# Patient Record
Sex: MALE | Race: WHITE | Employment: OTHER | URBAN - METROPOLITAN AREA
[De-identification: names, ages, dates, MRNs, and addresses within clinical notes are randomized per-mention and may not be internally consistent; named-entity substitution may affect disease eponyms.]

---

## 2018-04-02 ENCOUNTER — HOSPITAL ENCOUNTER (OUTPATIENT)
Dept: NON INVASIVE DIAGNOSTICS | Age: 83
Discharge: HOME | End: 2018-04-02
Attending: INTERNAL MEDICINE
Payer: MEDICARE

## 2018-04-02 DIAGNOSIS — R00.1 SEVERE SINUS BRADYCARDIA: ICD-10-CM

## 2018-04-02 PROCEDURE — 93225 XTRNL ECG REC<48 HRS REC: CPT

## 2019-10-30 ENCOUNTER — HOSPITAL ENCOUNTER (OUTPATIENT)
Age: 84
Setting detail: OUTPATIENT SURGERY
Discharge: HOME | End: 2019-10-30
Attending: INTERNAL MEDICINE | Admitting: INTERNAL MEDICINE
Payer: MEDICARE

## 2019-10-30 VITALS
TEMPERATURE: 98.1 F | HEIGHT: 66 IN | SYSTOLIC BLOOD PRESSURE: 125 MMHG | OXYGEN SATURATION: 98 % | WEIGHT: 170 LBS | RESPIRATION RATE: 16 BRPM | BODY MASS INDEX: 27.32 KG/M2 | DIASTOLIC BLOOD PRESSURE: 70 MMHG | HEART RATE: 63 BPM

## 2019-10-30 DIAGNOSIS — I25.83 CORONARY ARTERY DISEASE DUE TO LIPID RICH PLAQUE: ICD-10-CM

## 2019-10-30 DIAGNOSIS — I25.10 CORONARY ARTERY DISEASE DUE TO LIPID RICH PLAQUE: ICD-10-CM

## 2019-10-30 LAB
ANION GAP SERPL CALC-SCNC: 4 MMOL/L (ref 5–15)
BUN SERPL-MCNC: 19 MG/DL (ref 6–20)
BUN/CREAT SERPL: 19 (ref 12–20)
CALCIUM SERPL-MCNC: 8.9 MG/DL (ref 8.5–10.1)
CHLORIDE SERPL-SCNC: 105 MMOL/L (ref 97–108)
CO2 SERPL-SCNC: 28 MMOL/L (ref 21–32)
CREAT SERPL-MCNC: 1.02 MG/DL (ref 0.7–1.3)
ERYTHROCYTE [DISTWIDTH] IN BLOOD BY AUTOMATED COUNT: 12.1 % (ref 11.5–14.5)
GLUCOSE SERPL-MCNC: 86 MG/DL (ref 65–100)
HCT VFR BLD AUTO: 48.2 % (ref 36.6–50.3)
HGB BLD-MCNC: 15.8 G/DL (ref 12.1–17)
MCH RBC QN AUTO: 29.6 PG (ref 26–34)
MCHC RBC AUTO-ENTMCNC: 32.8 G/DL (ref 30–36.5)
MCV RBC AUTO: 90.3 FL (ref 80–99)
NRBC # BLD: 0 K/UL (ref 0–0.01)
NRBC BLD-RTO: 0 PER 100 WBC
PLATELET # BLD AUTO: 263 K/UL (ref 150–400)
PMV BLD AUTO: 10.2 FL (ref 8.9–12.9)
POTASSIUM SERPL-SCNC: 4.2 MMOL/L (ref 3.5–5.1)
RBC # BLD AUTO: 5.34 M/UL (ref 4.1–5.7)
SODIUM SERPL-SCNC: 137 MMOL/L (ref 136–145)
WBC # BLD AUTO: 6.4 K/UL (ref 4.1–11.1)

## 2019-10-30 PROCEDURE — C1753 CATH, INTRAVAS ULTRASOUND: HCPCS | Performed by: INTERNAL MEDICINE

## 2019-10-30 PROCEDURE — 74011000258 HC RX REV CODE- 258: Performed by: INTERNAL MEDICINE

## 2019-10-30 PROCEDURE — C1894 INTRO/SHEATH, NON-LASER: HCPCS | Performed by: INTERNAL MEDICINE

## 2019-10-30 PROCEDURE — 77030004538 HC CATH ANGI DX MP BSC -A: Performed by: INTERNAL MEDICINE

## 2019-10-30 PROCEDURE — 92928 PRQ TCAT PLMT NTRAC ST 1 LES: CPT | Performed by: INTERNAL MEDICINE

## 2019-10-30 PROCEDURE — 93454 CORONARY ARTERY ANGIO S&I: CPT | Performed by: INTERNAL MEDICINE

## 2019-10-30 PROCEDURE — 80048 BASIC METABOLIC PNL TOTAL CA: CPT

## 2019-10-30 PROCEDURE — C1725 CATH, TRANSLUMIN NON-LASER: HCPCS | Performed by: INTERNAL MEDICINE

## 2019-10-30 PROCEDURE — 85027 COMPLETE CBC AUTOMATED: CPT

## 2019-10-30 PROCEDURE — 77030013744: Performed by: INTERNAL MEDICINE

## 2019-10-30 PROCEDURE — 99153 MOD SED SAME PHYS/QHP EA: CPT | Performed by: INTERNAL MEDICINE

## 2019-10-30 PROCEDURE — C1887 CATHETER, GUIDING: HCPCS | Performed by: INTERNAL MEDICINE

## 2019-10-30 PROCEDURE — 93571 IV DOP VEL&/PRESS C FLO 1ST: CPT | Performed by: INTERNAL MEDICINE

## 2019-10-30 PROCEDURE — 74011250637 HC RX REV CODE- 250/637: Performed by: INTERNAL MEDICINE

## 2019-10-30 PROCEDURE — C1874 STENT, COATED/COV W/DEL SYS: HCPCS | Performed by: INTERNAL MEDICINE

## 2019-10-30 PROCEDURE — 74011250636 HC RX REV CODE- 250/636: Performed by: INTERNAL MEDICINE

## 2019-10-30 PROCEDURE — 99152 MOD SED SAME PHYS/QHP 5/>YRS: CPT | Performed by: INTERNAL MEDICINE

## 2019-10-30 PROCEDURE — 74011636320 HC RX REV CODE- 636/320: Performed by: INTERNAL MEDICINE

## 2019-10-30 PROCEDURE — C1769 GUIDE WIRE: HCPCS | Performed by: INTERNAL MEDICINE

## 2019-10-30 PROCEDURE — 36415 COLL VENOUS BLD VENIPUNCTURE: CPT

## 2019-10-30 PROCEDURE — 74011000250 HC RX REV CODE- 250: Performed by: INTERNAL MEDICINE

## 2019-10-30 DEVICE — XIENCE SIERRA™ EVEROLIMUS ELUTING CORONARY STENT SYSTEM 3.50 MM X 15 MM / RAPID-EXCHANGE
Type: IMPLANTABLE DEVICE | Status: FUNCTIONAL
Brand: XIENCE SIERRA™

## 2019-10-30 RX ORDER — FENTANYL CITRATE 50 UG/ML
INJECTION, SOLUTION INTRAMUSCULAR; INTRAVENOUS AS NEEDED
Status: DISCONTINUED | OUTPATIENT
Start: 2019-10-30 | End: 2019-10-30 | Stop reason: HOSPADM

## 2019-10-30 RX ORDER — SODIUM CHLORIDE 0.9 % (FLUSH) 0.9 %
5-40 SYRINGE (ML) INJECTION EVERY 8 HOURS
Status: CANCELLED | OUTPATIENT
Start: 2019-10-30

## 2019-10-30 RX ORDER — LIDOCAINE HYDROCHLORIDE 10 MG/ML
INJECTION INFILTRATION; PERINEURAL AS NEEDED
Status: DISCONTINUED | OUTPATIENT
Start: 2019-10-30 | End: 2019-10-30 | Stop reason: HOSPADM

## 2019-10-30 RX ORDER — SODIUM CHLORIDE 9 MG/ML
3 INJECTION, SOLUTION INTRAVENOUS CONTINUOUS
Status: DISPENSED | OUTPATIENT
Start: 2019-10-30 | End: 2019-10-30

## 2019-10-30 RX ORDER — HEPARIN SODIUM 1000 [USP'U]/ML
INJECTION, SOLUTION INTRAVENOUS; SUBCUTANEOUS AS NEEDED
Status: DISCONTINUED | OUTPATIENT
Start: 2019-10-30 | End: 2019-10-30 | Stop reason: HOSPADM

## 2019-10-30 RX ORDER — SODIUM CHLORIDE 9 MG/ML
1.5 INJECTION, SOLUTION INTRAVENOUS CONTINUOUS
Status: DISPENSED | OUTPATIENT
Start: 2019-10-30 | End: 2019-10-30

## 2019-10-30 RX ORDER — SODIUM CHLORIDE 0.9 % (FLUSH) 0.9 %
5-40 SYRINGE (ML) INJECTION AS NEEDED
Status: CANCELLED | OUTPATIENT
Start: 2019-10-30

## 2019-10-30 RX ORDER — MIDAZOLAM HYDROCHLORIDE 1 MG/ML
INJECTION, SOLUTION INTRAMUSCULAR; INTRAVENOUS AS NEEDED
Status: DISCONTINUED | OUTPATIENT
Start: 2019-10-30 | End: 2019-10-30 | Stop reason: HOSPADM

## 2019-10-30 RX ORDER — HEPARIN SODIUM 200 [USP'U]/100ML
INJECTION, SOLUTION INTRAVENOUS
Status: COMPLETED | OUTPATIENT
Start: 2019-10-30 | End: 2019-10-30

## 2019-10-30 RX ADMIN — SODIUM CHLORIDE 3 ML/KG/HR: 900 INJECTION, SOLUTION INTRAVENOUS at 09:22

## 2019-10-30 RX ADMIN — SODIUM CHLORIDE 1.5 ML/KG/HR: 900 INJECTION, SOLUTION INTRAVENOUS at 10:26

## 2019-10-30 NOTE — PROGRESS NOTES
TRANSFER - IN REPORT:    Verbal report received from Bridgett Zepeda on Suellen Black  being received from procedure room for routine progression of care. Report consisted of patients Situation, Background, Assessment and Recommendations(SBAR). Information from the following report(s) SBAR was reviewed with the receiving clinician. Opportunity for questions and clarification was provided. Assessment completed upon patients arrival to [address] [place] [address] and care assumed. Cardiac Cath Lab Recovery Arrival Note:    Suellen Black arrived to [facility] recovery area. Patient procedure= LHC; PTCA of LAD. Patient on cardiac monitor, non-invasive blood pressure, SPO2 monitor. On room air. IV  Of ANgiomax @ 287cc/hr. NS @ 116/hr. Patient status doing well without problems. Patient is A&Ox 3. Patient reports no CP. PROCEDURE SITE CHECK:    Procedure site:without any bleeding and no hematoma, No pain/discomfort reported at procedure site. No change in patient status. Continue to monitor patient and status. TR band with 10cc of air instilled in left radial artery.

## 2019-10-30 NOTE — PROGRESS NOTES
2cc of air let out of TR band; no bleeding; no hematoma; TR band removed; site area cleaned with chloraprepp and the 4 x 4 applied along with Tegaderm; continue to monitor.

## 2019-10-30 NOTE — Clinical Note
TRANSFER - OUT REPORT:  
 
Verbal report given to: Wang Devlin. Report consisted of patient's Situation, Background, Assessment and  
Recommendations(SBAR). Opportunity for questions and clarification was provided. Patient transported with a Registered Nurse. Patient transported to: [address] [facility].

## 2019-10-30 NOTE — PROGRESS NOTES
Pt. discharge with questions answered and pt/family verbalized understanding. Will call if any questions arise. Patient transported via wheelchair and RN to vehicle.

## 2019-10-30 NOTE — Clinical Note
Lesion: Located in the Proximal LAD. Stent inserted. Re-inflated stent balloon and multiple inflations used. First inflation pressure = 20 angeline; inflation time: 30 sec. Second inflation pressure: 20 angeline; inflation time: 30 sec.

## 2019-10-30 NOTE — ROUTINE PROCESS
Cardiac Cath Lab Recovery Arrival Note: 
 
 
Afua Jarrell arrived to Cardiac Cath Lab, Recovery Area. Staff introduced to patient. Patient identifiers verified with NAME and DATE OF BIRTH. Procedure verified with patient. Consent forms reviewed and signed by patient or authorized representative and verified. Allergies verified. Patient and family oriented to department. Patient and family informed of procedure and plan of care. Questions answered with review. Patient prepped for procedure, per orders from physician, prior to arrival. 
 
Patient on cardiac monitor, non-invasive blood pressure, SPO2 monitor. On room air. Patient is A&Ox 3. Patient reports no CP. Patient in stretcher, in low position, with side rails up, call bell within reach, patient instructed to call if assistance as needed. Patient prep in: [address], [place] [address] Prep by: DON

## 2019-10-30 NOTE — DISCHARGE INSTRUCTIONS
Patient Education        Percutaneous Coronary Intervention: What to Expect at Home  Your Recovery    Percutaneous coronary intervention (PCI) is the name for procedures that are used to open a narrowed or blocked coronary artery. The two most common PCI procedures are coronary angioplasty and coronary stent placement. Your groin or arm may have a bruise and feel sore for a day or two after a percutaneous coronary intervention (PCI). You can do light activities around the house, but nothing strenuous for several days. This care sheet gives you a general idea about how long it will take for you to recover. But each person recovers at a different pace. Follow the steps below to get better as quickly as possible. How can you care for yourself at home? Activity    · If the doctor gave you a sedative:  ? For 24 hours, don't do anything that requires attention to detail, such as going to work, making important decisions, or signing any legal documents. It takes time for the medicine's effects to completely wear off.  ? For your safety, do not drive or operate any machinery that could be dangerous. Wait until the medicine wears off and you can think clearly and react easily.     · Do not do strenuous exercise and do not lift, pull, or push anything heavy until your doctor says it is okay. This may be for a day or two. You can walk around the house and do light activity, such as cooking.     · If the catheter was placed in your groin, try not to walk up stairs for the first couple of days.     · If the catheter was placed in your arm near your wrist, do not bend your wrist deeply for the first couple of days. Be careful using your hand to get into and out of a chair or bed.     · Carry your stent identification card with you at all times.     · If your doctor recommends it, get more exercise. Walking is a good choice. Bit by bit, increase the amount you walk every day.  Try for at least 30 minutes on most days of the week.   Diet    · Drink plenty of fluids to help your body flush out the dye. If you have kidney, heart, or liver disease and have to limit fluids, talk with your doctor before you increase the amount of fluids you drink.     · Keep eating a heart-healthy diet that has lots of fruits, vegetables, and whole grains. If you have not been eating this way, talk to your doctor. You also may want to talk to a dietitian. This expert can help you to learn about healthy foods and plan meals. Medicines    · Your doctor will tell you if and when you can restart your medicines. He or she will also give you instructions about taking any new medicines.     · If you take blood thinners, such as warfarin (Coumadin), clopidogrel (Plavix), or aspirin, be sure to talk to your doctor. He or she will tell you if and when to start taking those medicines again. Make sure that you understand exactly what your doctor wants you to do.     · Your doctor will prescribe blood-thinning medicines. You will likely take aspirin plus another antiplatelet, such as clopidogrel (Plavix). It is very important that you take these medicines exactly as directed. These medicines help keep the coronary artery open and reduce your risk of a heart attack.     · Call your doctor if you think you are having a problem with your medicine.    Care of the catheter site    · For 1 or 2 days, keep a bandage over the spot where the catheter was inserted. The bandage probably will fall off in this time.     · Put ice or a cold pack on the area for 10 to 20 minutes at a time to help with soreness or swelling. Put a thin cloth between the ice and your skin.     · You may shower 24 to 48 hours after the procedure, if your doctor okays it. Pat the incision dry.     · Do not soak the catheter site until it is healed. Don't take a bath for 1 week, or until your doctor tells you it is okay.     · Watch for bleeding from the site.  A small amount of blood (up to the size of a quarter) on the bandage can be normal.     · If you are bleeding, lie down and press on the area for 15 minutes to try to make it stop. If the bleeding does not stop, call your doctor or seek immediate medical care. Follow-up care is a key part of your treatment and safety. Be sure to make and go to all appointments, and call your doctor if you are having problems. It's also a good idea to know your test results and keep a list of the medicines you take. When should you call for help? Call 911 anytime you think you may need emergency care. For example, call if:    · You passed out (lost consciousness).     · You have severe trouble breathing.     · You have sudden chest pain and shortness of breath, or you cough up blood.     · You have symptoms of a heart attack, such as:  ? Chest pain or pressure. ? Sweating. ? Shortness of breath. ? Nausea or vomiting. ? Pain that spreads from the chest to the neck, jaw, or one or both shoulders or arms. ? Dizziness or lightheadedness. ? A fast or uneven pulse. After calling 911, chew 1 adult-strength aspirin. Wait for an ambulance. Do not try to drive yourself.     · You have been diagnosed with angina, and you have angina symptoms that do not go away with rest or are not getting better within 5 minutes after you take one dose of nitroglycerin.    Call your doctor now or seek immediate medical care if:    · You are bleeding from the area where the catheter was put in your artery.     · You have a fast-growing, painful lump at the catheter site.     · You have signs of infection, such as:  ? Increased pain, swelling, warmth, or redness. ? Red streaks leading from the catheter site. ? Pus draining from the catheter site. ? A fever.     · Your leg, arm, or hand is painful, looks blue, or feels cold, numb, or tingly.    Watch closely for changes in your health, and be sure to contact your doctor if you have any problems. Where can you learn more?   Go to http://suha-tana.info/. Enter J460 in the search box to learn more about \"Percutaneous Coronary Intervention: What to Expect at Home. \"  Current as of: April 9, 2019  Content Version: 12.2  © 1573-5446 [employer]. Care instructions adapted under license by [employer] (which disclaims liability or warranty for this information). If you have questions about a medical condition or this instruction, always ask your healthcare professional. [address] any warranty or liability for your use of this information.

## 2019-10-30 NOTE — PROGRESS NOTES
1035-   Cardiac Cath Lab Procedure Area Arrival Note:    Crawford Fothergill arrived to Cardiac Cath Lab, Procedure Area. Patient identifiers verified with NAME and DATE OF BIRTH. Procedure verified with patient. Consent forms verified. Allergies verified. Patient informed of procedure and plan of care. Questions answered with review. Patient voiced understanding of procedure and plan of care. Patient on cardiac monitor, non-invasive blood pressure, SPO2 monitor. Placed on O2 @ 2 lpm via NC .  IV of Ns on pump at 116 ml/hr. Patient status doing well without problems. Patient is A&Ox 4. Patient reports no complaints of pain. Patient medicated during procedure with orders obtained and verified by Dr. Hussain Mosley. Refer to patients Cardiac Cath Lab PROCEDURE REPORT for vital signs, assessment, status, and response during procedure, printed at end of case. Printed report on chart or scanned into chart. 1135- TRANSFER - OUT REPORT:    Verbal report given to [id number] Kelli Hallman RN (name) on Crawford Fothergill  being transferred to (unit) for routine post - op       Report consisted of patients Situation, Background, Assessment and   Recommendations(SBAR). Information from the following report(s) Procedure Summary and MAR was reviewed with the receiving nurse. Lines:   Peripheral IV 10/30/19 Left Arm (Active)        Opportunity for questions and clarification was provided.       Patient transported with:   Registered Nurse

## 2019-10-30 NOTE — CARDIO/PULMONARY
Cardiac Rehab: CAD education folder given to Marshall Barillas. Educated using teach back method. Reviewed CAD diagnosis definition and purpose of intervention. Emphasized the value of cardiac rehab. Discussed Cardiac Rehab Program format, benefits, and encouraged enrollment to assist with risk modification and management. He has done cardiac rehab in the past although I do not think he completed the course. He is interested in re-enrollment however may prefer to do an [address] Initial Cardiac Rehab Program intake appointment scheduled for 11/13/19 at 3 pm and appointment information is on the AVS. 
 
Marshall Barillas verbalized understanding with questions answered.  Kelin Coley RN

## 2019-10-30 NOTE — Clinical Note
Lesion located in the Proximal LAD. Balloon inserted. Balloon inflated using multiple inflations inflation technique. Pressure = 20 angeline; Duration = 30 sec. Inflation 2: Pressure: 12 angeline; Duration: 30 sec.

## 2019-11-11 ENCOUNTER — TELEPHONE (OUTPATIENT)
Dept: CARDIAC REHAB | Age: 84
End: 2019-11-11

## 2019-11-11 NOTE — TELEPHONE ENCOUNTER
11/11/2019 Cardiac Rehab: Called Mr. Spenser Carrington to remind of intake appointment on Wednesday, 11/13/19. Left voicemail message. Provided patient with contact information for [facility]. Also, reminded patient to bring a list of current medications, a personal schedule, and to wear comfortable clothes and shoes.  Arcelia Gowers

## 2019-11-13 ENCOUNTER — HOSPITAL ENCOUNTER (OUTPATIENT)
Dept: CARDIAC REHAB | Age: 84
Discharge: HOME | End: 2019-11-13
Payer: COMMERCIAL

## 2019-11-13 VITALS — WEIGHT: 169 LBS | HEIGHT: 66 IN | BODY MASS INDEX: 27.16 KG/M2

## 2019-11-13 PROCEDURE — 93798 PHYS/QHP OP CAR RHAB W/ECG: CPT

## 2019-11-13 NOTE — CARDIO/PULMONARY
The following was faxed to Dr. Ronald Ta office: April/Dr. Neal Carrillo (s/p stent 10/30/19) came for his first cardiac rehab appointment. Rhythm irregular, possible afib?/junctional?. Asymptomatic, /76 pre-exercise, 162/82 during, post exercise 120/78. Currently taking:  ASA 81mg daily  Brilinta 90mg BID    Attached is todays session report for you to review. Has appointment on Friday. Also, could you let us know what his most recent EF is? Not assessed in cath, office note states 62% in 2018.     Thank you,  Eduardo Stevenson RN

## 2019-11-13 NOTE — CARDIO/PULMONARY
Audrey Davenport   82 y.o.    presented to cardiac rehab for orientation and exercise tolerance test today with a primary diagnosis of 1 stent. Audrey Davenoprt has a history of 2 previous stents in 2014. Cardiac risk factors include dyslipidemia, stress, and age and these were reviewed with the patient. Audrey Davenport is  and lives with his wife. They have several grandchildren. Mr. Bassam Henry enjoys golfing and playing doubles tennis. PHQ9, depression score, is 3 and this is considered normal. Pt denies depression. Patient denied chest pain or SOB during 6 minute walk and was in SB/SR 1st deg. AVB, occ PACs/junctional, possible aifb? Spoke with Rinku Akers RN at Dr. Alvarez Heady office regarding irregular rhythm as pt has an appointment with Dr. Mark Leahy Friday. EF was not assessed - most recent office note records pt's EF as 62% (2018). Education manual given to and reviewed with patient. Session report to be faxed to Dr. Mark Leahy, and copy given to pt to take to his appointment.

## 2019-11-18 ENCOUNTER — HOSPITAL ENCOUNTER (OUTPATIENT)
Dept: CARDIAC REHAB | Age: 84
Discharge: HOME | End: 2019-11-18
Payer: COMMERCIAL

## 2019-11-18 VITALS — WEIGHT: 169.4 LBS | BODY MASS INDEX: 27.34 KG/M2

## 2019-11-18 PROCEDURE — 93798 PHYS/QHP OP CAR RHAB W/ECG: CPT

## 2019-11-25 ENCOUNTER — HOSPITAL ENCOUNTER (OUTPATIENT)
Dept: CARDIAC REHAB | Age: 84
Discharge: HOME | End: 2019-11-25
Payer: COMMERCIAL

## 2019-11-25 VITALS — BODY MASS INDEX: 26.12 KG/M2 | WEIGHT: 161.8 LBS

## 2019-11-25 PROCEDURE — 93797 PHYS/QHP OP CAR RHAB WO ECG: CPT | Performed by: DIETITIAN, REGISTERED

## 2019-11-25 PROCEDURE — 93798 PHYS/QHP OP CAR RHAB W/ECG: CPT

## 2019-11-25 NOTE — PROGRESS NOTES
Cardiac Rehab Nutrition Assessment - 1:1 Evaluation           NAME: Bud Higgins : 1935 AGE: 82 y.o. GENDER: male  CARDIAC REHAB ADMITTING DIAGNOSIS: PCI    Relevant Comorbidites: dyslipidemia and cardiovascular disease        LABS:   No results found for: HBA1C, HGBE8, GII7ECWE, VNC6DHXD  Lab Results   Component Value Date/Time    Cholesterol, total 205 (H) 2010 09:15 AM    HDL Cholesterol 55 2010 09:15 AM    LDL, calculated 131.2 (H) 2010 09:15 AM    VLDL, calculated 18.8 2010 09:15 AM    Triglyceride 94 2010 09:15 AM    CHOL/HDL Ratio 3.7 2010 09:15 AM         MEDICATIONS/SUPPLEMENTS:   [unfilled]  Prior to Admission medications    Medication Sig Start Date End Date Taking? Authorizing Provider   3 [facility] vitamin    Provider, Historical   ticagrelor (BRILINTA) 90 mg tablet Take 1 Tab by mouth every twelve (12) hours every twelve (12) hours. 10/30/19   Alyssa Cordoba MD   aspirin delayed-release 81 mg tablet Take 81 mg by mouth daily. Provider, Historical   omeprazole (PRILOSEC) 40 mg capsule Take 40 mg by mouth nightly. Provider, Historical   pravastatin (PRAVACHOL) 40 mg tablet Take 40 mg by mouth every other day.     Provider, Historical           ANTHROPOMETRICS:    Ht Readings from Last 1 Encounters:   19 5' 6\" (1.676 m)      Wt Readings from Last 1 Encounters:   19 76.8 kg (169 lb 6.4 oz)      IBW:142 # +/- 10%  %IBW: 119 % +/- 10%    BMI: 27.3 kg/M2 Category: overweight  Waist: 37.25 inches    Reported Wt Hx: current weight is approximately usual body weight    Reported Diet Hx:    Rate Your Plate Score: 55  (Score 58-72: Making many healthy choices; 41-57: Some choices need improving 24-40: many choices need improving)     24 Hour Diet Recall  Breakfast Cinnamon crunch cereal with 2% milk, small sweet roll   Lunch Leftovers: chicken & cheese roll, part of a calzone   Butler[place]ar[place] with sour cream, sauteed spinach w/ egg, 4-5 baked chicken nuggets with BBQ sauce   Snacks Ham biscuit, a couple of cookies, slice of apple pie   Beverages 3 cups coffee with 1 Tbsp sweet creamer, water, apple cider     Jace Novoa states yesterday lunch was atypical because at his daughter's house. Breakfast is usually cereal with 1/2 a banana, lunch a sandwich with chips. Snacks are generally crackers and cookies. Most days will have a glass of deandra before dinner. Environmental/Social: , retired, has a dog he walks several times a day (for short distance), enjoys golf and tennis on occasion (no more than once a week, weather permitting)        NUTRITION INTERVENTION:  Nutrition 60 minute one-on-one education & goal setting with [address] [place] [address] with Jace Novoa relevant labs compared to ideals. Reviewed weight history and patient's verbalized weight goal as well as any real or perceived barriers to obtaining the goal. Collaborated with patient to set a specific short and long term weight goal.     Reviewed Rate Your Plate and conducted a verbal diet recall. Assessed for environmental, financial, psychosocial, physical and comorbidities that may impact the food and eating patterns / behaviors of Tomás Carbajal with patient to set specific nutrient goals as well as specific food / behavior changes that will help patient meet the overall goal of following a heart healthy eating pattern (using guidelines as set forth by the American Heart Association and modeled after healthful eating patterns as recognized by the USDA Dietary Guidelines such as DASH, Mediterranean or plant-based). Briefly reviewed with Jace Novoa the nutrition information in the Cardiac Rehab patient education book and encouraged Jace Novoa to read thoroughly, ask questions as needed, and use for future reference for heart healthy nutrition information.       Jace Novoa is not scheduled to participate in Cardiac Rehab group nutrition classes. PATIENT GOALS:    Weight Goals:  Short Term Weight Goal:165 +/- 5 lbs   Long Term Weight Goal:165 +/- 5 lbs    Nutrition Goals:  Daily Recommendations:  Calories: 1800 /day  (using Siddharth Salon with AF 1.3)    Saturated Fat: no more than 12 g/day  Trans Fat: 0 g/day  Sodium: no more than 2400 mg/day  Fruit: 2-2.5 cups / day  Vegetables: 2.5 or more cups/day    Other:  - read and compare food labels  - limit added sugars to 9 teaspoons (36 grams) per day (per AHA)  - keep temptations out of the house / out of sight  - try chewing sugarless gum vs snacking; or substitute healthier snacks such as fruit or nuts vs cookies or crackers  - minimize alcohol  - eat fish (not fried) at least twice a week      Keeping a food diary was recommended. Questions addressed. Follow-up plans discussed. Shane Jo verbalized understanding.             Junior Cedillo RD

## 2019-12-02 ENCOUNTER — HOSPITAL ENCOUNTER (OUTPATIENT)
Dept: CARDIAC REHAB | Age: 84
Discharge: HOME | End: 2019-12-02
Payer: COMMERCIAL

## 2019-12-02 VITALS — BODY MASS INDEX: 27.41 KG/M2 | WEIGHT: 169.8 LBS

## 2019-12-02 PROCEDURE — 93798 PHYS/QHP OP CAR RHAB W/ECG: CPT

## 2020-03-02 ENCOUNTER — TELEPHONE (OUTPATIENT)
Dept: CARDIAC REHAB | Age: 85
End: 2020-03-02

## 2020-03-02 NOTE — TELEPHONE ENCOUNTER
Cardiac Rehab: Call placed to Abby Cintron to check on his absence from the program. Spoke with pt., and he is back to playing tennis and exercising independently so he asked to be discharged from the [address]

## 2020-04-07 NOTE — CARDIO/PULMONARY
Truman Healy 82 y.o. With diagnosis of a stent (10/30/19), Mr. Fernando Patel attended phase II cardiac rehab for 5 sessions from 11/13/19 - 12/2/19. Spoke with Mr. Fernando Patel on 3/2, he is back to playing tennis and exercising on his own. Will discharge. Blas Ann RN 
4/7/2020

## 2022-10-19 ENCOUNTER — OFFICE VISIT (OUTPATIENT)
Dept: SURGERY | Age: 87
End: 2022-10-19
Payer: COMMERCIAL

## 2022-10-19 VITALS
DIASTOLIC BLOOD PRESSURE: 67 MMHG | HEIGHT: 66 IN | WEIGHT: 170.8 LBS | HEART RATE: 70 BPM | BODY MASS INDEX: 27.45 KG/M2 | SYSTOLIC BLOOD PRESSURE: 105 MMHG | OXYGEN SATURATION: 95 % | RESPIRATION RATE: 18 BRPM | TEMPERATURE: 97.5 F

## 2022-10-19 DIAGNOSIS — K40.90 LEFT INGUINAL HERNIA: Primary | ICD-10-CM

## 2022-10-19 PROCEDURE — 1123F ACP DISCUSS/DSCN MKR DOCD: CPT | Performed by: SURGERY

## 2022-10-19 PROCEDURE — 99203 OFFICE O/P NEW LOW 30 MIN: CPT | Performed by: SURGERY

## 2022-10-19 RX ORDER — ROSUVASTATIN CALCIUM 20 MG/1
20 TABLET, COATED ORAL
COMMUNITY

## 2022-10-19 RX ORDER — FAMOTIDINE 40 MG/1
40 TABLET, FILM COATED ORAL DAILY
COMMUNITY

## 2022-10-19 NOTE — LETTER
10/20/2022    Patient: Freddie Harris   YOB: 1935   Date of Visit: 10/19/2022     Skyler Mares MD  [address] [place], [place] [address]l [phone]  Via Fax: [phone]    Dear Skyler Mares MD,      Thank you for referring Mr. Ivonne Olvera to [facility] for evaluation. My notes for this consultation are attached. If you have questions, please do not hesitate to call me. I look forward to following your patient along with you.       Sincerely,    Talha Perales MD

## 2022-10-19 NOTE — PROGRESS NOTES
Identified pt with two pt identifiers (name and ). Reviewed chart in preparation for visit and have obtained necessary documentation. Ileana De La Garza is a 82 y.o. male  Chief Complaint   Patient presents with    New Patient    Inguinal Hernia     Lt side     Visit Vitals  /67 (BP 1 Location: Left arm, BP Patient Position: Sitting, BP Cuff Size: Adult)   Pulse 70   Temp 97.5 °F (36.4 °C) (Oral)   Resp 18   Ht 5' 6\" (1.676 m)   Wt 170 lb 12.8 oz (77.5 kg)   SpO2 95%   BMI 27.57 kg/m²       1. Have you been to the ER, urgent care clinic since your last visit? Hospitalized since your last visit? No    2. Have you seen or consulted any other health care providers outside of the [address] [place] [address] since your last visit? Include any pap smears or colon screening.  No

## 2022-10-19 NOTE — PROGRESS NOTES
[facility] at [facility] Surgery History and Physical    History of Present Illness:      Prince Mullen is a 82 y.o. male who who has a left inguinal hernia. He had some pain 1 time a few weeks ago when he was some lifting something heavy. He has noticed a small bulge in the left groin. The pain is a 2 out of 3. He has had normal bowel movements. Past Medical History:   Diagnosis Date    CAD (coronary artery disease)     GERD (gastroesophageal reflux disease)     High cholesterol        Past Surgical History:   Procedure Laterality Date    HX APPENDECTOMY      HX HEART CATHETERIZATION  2014    2 stents    HX HEART CATHETERIZATION  10/30/2019    1 stent    HX HERNIA REPAIR  2012    HX VASECTOMY      30 years ago         Current Outpatient Medications:     famotidine (PEPCID) 40 mg tablet, Take 40 mg by mouth daily. , Disp: , Rfl:     rosuvastatin (CRESTOR) 20 mg tablet, Take 20 mg by mouth nightly., Disp: , Rfl:     OTHER, Eye Health - eye vitamin, Disp: , Rfl:     ticagrelor (BRILINTA) 90 mg tablet, Take 1 Tab by mouth every twelve (12) hours every twelve (12) hours. , Disp: 60 Tab, Rfl: 5    aspirin delayed-release 81 mg tablet, Take 81 mg by mouth daily. , Disp: , Rfl:     omeprazole (PRILOSEC) 40 mg capsule, Take 40 mg by mouth nightly., Disp: , Rfl:     pravastatin (PRAVACHOL) 40 mg tablet, Take 40 mg by mouth every other day., Disp: , Rfl:     Allergies   Allergen Reactions    Plavix [Clopidogrel] Rash    Sulfa (Sulfonamide Antibiotics) Rash    Beta Blocker [Beta-Blockers (Beta-Adrenergic Blocking Agts)] Other (comments)     bradycardia       Social History     Socioeconomic History    Marital status:      Spouse name: Not on file    Number of children: Not on file    Years of education: Not on file    Highest education level: Not on file   Occupational History    Not on file   Tobacco Use    Smoking status: Former     Types: Cigarettes     Quit date: 1/17/2006     Years since quittin.7    Smokeless tobacco: Never   Substance and Sexual Activity    Alcohol use: Yes     Alcohol/week: 4.2 standard drinks     Types: 5 Standard drinks or equivalent per week     Comment: social    Drug use: Not on file    Sexual activity: Not on file   Other Topics Concern     Service Not Asked    Blood Transfusions Not Asked    Caffeine Concern Not Asked    Occupational Exposure Not Asked    Hobby Hazards Not Asked    Sleep Concern Not Asked    Stress Concern Not Asked    Weight Concern Not Asked    Special Diet Not Asked    Back Care Not Asked    Exercise Not Asked    Bike Helmet Not Asked    Seat Belt Not Asked    Self-Exams Not Asked   Social History Narrative    Not on file     Social Determinants of Health     Financial Resource Strain: Not on file   Food Insecurity: Not on file   Transportation Needs: Not on file   Physical Activity: Not on file   Stress: Not on file   Social Connections: Not on file   Intimate Partner Violence: Not on file   Housing Stability: Not on file       Family History   Problem Relation Age of Onset    Heart Disease Father         CHF?     Emphysema Father     Heart Disease Sister 58        MI    Psychiatric Disorder Sister 25        schizophrenia    Lung Disease Sister     Mental Retardation Brother         from age 3    No Known Problems Sister     Cancer Paternal Aunt        ROS   Constitutional: negative  Ears, Nose, Mouth, Throat, and Face: negative  Respiratory: negative  Cardiovascular: negative  Gastrointestinal: negative  Genitourinary:negative  Integument/Breast: negative  Hematologic/Lymphatic: negative  Behavioral/Psychiatric: negative  Allergic/Immunologic: negative      Physical Exam:     Visit Vitals  /67 (BP 1 Location: Left arm, BP Patient Position: Sitting, BP Cuff Size: Adult)   Pulse 70   Temp 97.5 °F (36.4 °C) (Oral)   Resp 18   Ht 5' 6\" (1.676 m)   Wt 170 lb 12.8 oz (77.5 kg)   SpO2 95%   BMI 27.57 kg/m²       General - alert and oriented, no apparent distress  HEENT - no jaundice, no hearing imparement  Pulm - CTAB, no C/W/R  CV - RRR, no M/R/G  Abd -soft, nondistended, bowel sounds present, left inguinal hernia present that is soft and reducible and small  Ext - pulses intact in UE and LE bilaterally, no edema  Skin - supple, no rashes  Psychiatric - normal affect, good mood    Labs  Lab Results   Component Value Date/Time    Sodium 137 10/30/2019 09:26 AM    Potassium 4.2 10/30/2019 09:26 AM    Chloride 105 10/30/2019 09:26 AM    CO2 28 10/30/2019 09:26 AM    Anion gap 4 (L) 10/30/2019 09:26 AM    Glucose 86 10/30/2019 09:26 AM    BUN 19 10/30/2019 09:26 AM    Creatinine 1.02 10/30/2019 09:26 AM    BUN/Creatinine ratio 19 10/30/2019 09:26 AM    GFR est AA >60 10/30/2019 09:26 AM    GFR est non-AA >60 10/30/2019 09:26 AM    Calcium 8.9 10/30/2019 09:26 AM    Bilirubin, total 0.9 11/30/2016 09:14 AM    Alk. phosphatase 83 11/30/2016 09:14 AM    Protein, total 6.9 11/30/2016 09:14 AM    Albumin 3.8 11/30/2016 09:14 AM    Globulin 3.1 11/30/2016 09:14 AM    A-G Ratio 1.2 11/30/2016 09:14 AM    ALT (SGPT) 29 11/30/2016 09:14 AM    AST (SGOT) 21 11/30/2016 09:14 AM     Lab Results   Component Value Date/Time    WBC 6.4 10/30/2019 09:26 AM    HGB 15.8 10/30/2019 09:26 AM    HCT 48.2 10/30/2019 09:26 AM    PLATELET 251 75/30/6159 09:26 AM    MCV 90.3 10/30/2019 09:26 AM         Imaging  none  I have reviewed and agree with all of the pertinent images    Assessment:     Roshan Marin is a 82 y.o. male with left inguinal hernia    Recommendations:     1. He appears to have a small fat-containing left inguinal hernia that is barely palpable on exam.  He does not have any current pain and has only had pain 1 time before. At this point since it is very small at most he would like to hold off on doing surgery.   For now continue normal activities avoid heavy lifting and come back to see me if there is any increase in size or pain in the hernia area.    Tesfaye Biggs MD    Greater than half of the time: 30 minutes was used in counciling the patient about diagnosis and treatment plan    Mr. Lsia Ko has a reminder for a \"due or due soon\" health maintenance. I have asked that he contact his primary care provider for follow-up on this health maintenance.

## 2023-04-06 ENCOUNTER — OFFICE VISIT (OUTPATIENT)
Dept: ORTHOPEDIC SURGERY | Age: 88
End: 2023-04-06

## 2023-04-06 NOTE — PROGRESS NOTES
Dimitry Delgadillo (: 1935) is a 82 y.o. male patient, here for evaluation of the following chief complaint(s):  Back Pain       ASSESSMENT/PLAN:  Below is the assessment and plan developed based on review of pertinent history, physical exam, labs, studies, and medications. 82-year-old male comes in today for right-sided low back pain. Intermittent in nature. Worse in the morning, better throughout the day. No overt radiation of pain. Can still walk unlimited distances. X-rays show degenerative changes throughout lumbar spine. Symptoms most likely consistent with mechanical low back pain. Discussed treatment options with patient. He would like to try a steroid pack and small prescription of muscle relaxer as needed. Risks and benefits of medication discussed with patient. Patient verbalized understanding. Encouraged physical therapy more like to hold off on formal physical therapy at this time. Plans to monitor symptoms and follow-up as needed. 1. Mechanical low back pain  -     XR SPINE LUMB 2 OR 3 V; Future      Encounter Diagnosis   Name Primary? Mechanical low back pain Yes        No follow-ups on file. SUBJECTIVE/OBJECTIVE:  Dimitry Delgadillo (: 1935) is a 82 y.o. male who presents today for the following:  Chief Complaint   Patient presents with    Back Pain       82-year-old male comes in today for right-sided low back pain. Intermittent in nature. Worse in the morning, better throughout the day. No overt radiation of pain. Can still walk unlimited distances. IMAGING:  XR Results (most recent):  Results from Appointment encounter on 23    XR SPINE LUMB 2 OR 3 V    Narrative  AP and lateral x-ray lumbar spine shows significant degenerative changes noted throughout entire lumbar spine. Including joint space narrowing and osteophyte formation.        Allergies   Allergen Reactions    Plavix [Clopidogrel] Rash    Sulfa (Sulfonamide Antibiotics) Rash Beta Blocker [Beta-Blockers (Beta-Adrenergic Blocking Agts)] Other (comments)     bradycardia       Current Outpatient Medications   Medication Sig    methylPREDNISolone (MEDROL DOSEPACK) 4 mg tablet Take 1 Tablet by mouth Specific Days and Specific Times. Per dose pack instructions    cyclobenzaprine (FLEXERIL) 5 mg tablet Take 1 Tablet by mouth nightly as needed for Muscle Spasm(s). famotidine (PEPCID) 40 mg tablet Take 1 Tablet by mouth daily. rosuvastatin (CRESTOR) 20 mg tablet Take 1 Tablet by mouth nightly. OTHER Eye Health - eye vitamin    ticagrelor (BRILINTA) 90 mg tablet Take 1 Tab by mouth every twelve (12) hours every twelve (12) hours. aspirin delayed-release 81 mg tablet Take 1 Tablet by mouth daily. omeprazole (PRILOSEC) 40 mg capsule Take 1 Capsule by mouth nightly. pravastatin (PRAVACHOL) 40 mg tablet Take 1 Tablet by mouth every other day. No current facility-administered medications for this visit. Past Medical History:   Diagnosis Date    CAD (coronary artery disease)     GERD (gastroesophageal reflux disease)     High cholesterol         Past Surgical History:   Procedure Laterality Date    HX APPENDECTOMY      HX HEART CATHETERIZATION      2 stents    HX HEART CATHETERIZATION  10/30/2019    1 stent    HX HERNIA REPAIR      HX VASECTOMY      30 years ago       Family History   Problem Relation Age of Onset    Heart Disease Father         CHF? Emphysema Father     Heart Disease Sister 58        MI    Psychiatric Disorder Sister 25        schizophrenia    Lung Disease Sister     Mental Retardation Brother         from age 3    No Known Problems Sister     Cancer Paternal Aunt         Social History     Tobacco Use    Smoking status: Former     Types: Cigarettes     Quit date: 2006     Years since quittin.2    Smokeless tobacco: Never   Substance Use Topics    Alcohol use:  Yes     Alcohol/week: 4.2 standard drinks     Types: 5 Standard drinks or equivalent per week     Comment: social        All systems reviewed x 12 and were negative with the exception of None      No flowsheet data found. Vitals:  Ht 5' 6\" (1.676 m)   Wt 170 lb (77.1 kg)   BMI 27.44 kg/m²    Body mass index is 27.44 kg/m². Physical Exam    General: NAD, well developed, well nourished. Cardiac: Extremities well perfused. Respiratory: Nonlabored breathing. RLE: No antalgic gait. Negative stinchfield. 0-100 degrees of flexion. >20 degrees internal rotation, >30 degrees external rotation. Negative KHANG. No pain with flexion adduction and internal rotation. .  Motor strength grossly intact. LLE: No antalgic gait. Negative stinchfield. 0-100 degrees of flexion. >20 degrees internal rotation, >30 degrees external rotation. Negative KHANG. No pain with flexion adduction and internal rotation. .  Motor strength grossly intact. Skin: Warm well perfused. Vascular: Palpable pedal pulses bilaterally. Equal. Capillary refill less than 2 seconds. Ochoa Schneider M.D. was available for immediate consultation as the supervising physician. An electronic signature was used to authenticate this note.   -- Casey Pyle PA-C

## (undated) DEVICE — ANGIOGRAPHIC CATHETER: Brand: IMPULSE™

## (undated) DEVICE — GUIDEWIRE VASC L260CM DIA0.035IN TIP L3MM STD EXCHG PTFE J

## (undated) DEVICE — PACK PROCEDURE SURG HRT CATH

## (undated) DEVICE — RUNTHROUGH NS EXTRA FLOPPY PTCA GUIDEWIRE: Brand: RUNTHROUGH

## (undated) DEVICE — KIT MED IMAG CNTRST AGNT W/ IOPAMIDOL REUSE

## (undated) DEVICE — ANGIOGRAPHY KIT

## (undated) DEVICE — KIT MFLD ISOLATN NACL CNTRST PRT TBNG SPIK W/ PRSS TRNSDUC

## (undated) DEVICE — GLIDESHEATH SLENDER ACCESS KIT: Brand: GLIDESHEATH SLENDER

## (undated) DEVICE — CATH GUID COR JL4.0 6FR 100CM -- LAUNCHER

## (undated) DEVICE — Device: Brand: EAGLE EYE PLATINUM RX DIGITAL IVUS CATHETER

## (undated) DEVICE — NC TREK CORONARY DILATATION CATHETER 3.5 MM X 20 MM / RAPID-EXCHANGE: Brand: NC TREK

## (undated) DEVICE — KIT HND CTRL 3 W STPCOCK ROT END 54IN PREM HI PRSS TBNG AT